# Patient Record
Sex: FEMALE | Race: WHITE | NOT HISPANIC OR LATINO | Employment: OTHER | URBAN - METROPOLITAN AREA
[De-identification: names, ages, dates, MRNs, and addresses within clinical notes are randomized per-mention and may not be internally consistent; named-entity substitution may affect disease eponyms.]

---

## 2017-07-24 ENCOUNTER — OFFICE VISIT (OUTPATIENT)
Dept: GASTROENTEROLOGY | Facility: CLINIC | Age: 72
End: 2017-07-24

## 2017-07-24 VITALS
SYSTOLIC BLOOD PRESSURE: 126 MMHG | BODY MASS INDEX: 27.3 KG/M2 | DIASTOLIC BLOOD PRESSURE: 74 MMHG | HEIGHT: 66 IN | WEIGHT: 169.85 LBS

## 2017-07-24 DIAGNOSIS — A04.8 BACTERIAL INFECTION DUE TO HELICOBACTER PYLORI: ICD-10-CM

## 2017-07-24 DIAGNOSIS — Z12.11 ENCOUNTER FOR SCREENING FOR MALIGNANT NEOPLASM OF COLON: Primary | ICD-10-CM

## 2017-07-24 PROCEDURE — 99204 OFFICE O/P NEW MOD 45 MIN: CPT | Performed by: INTERNAL MEDICINE

## 2017-07-24 NOTE — PROGRESS NOTES
Chief Complaint   Patient presents with   • Chest Pain   • Difficulty Swallowing       Ruby Pabon is a 72 y.o. female who presents with Weight loss 1 year ago, diagnosed with H. pylori, in need of screening colonoscopy    HPI Comments: 72-year-old female that developed melena and weight loss 1 year ago.  She was placed on a PPI and check for H. pylori.  She was positive for H. pylori and was given 2 weeks of antibiotics.  She is here to schedule her screening colonoscopy and would like me to perform EGD due to the symptoms she was having last year.      Past Medical History:   Diagnosis Date   • Hyperlipidemia    • Hypertension        Past Surgical History:   Procedure Laterality Date   • COLONOSCOPY  approx 2007    normal per patient   • WISDOM TOOTH EXTRACTION           Current Outpatient Prescriptions:   •  LISINOPRIL PO, Take  by mouth., Disp: , Rfl:   •  metoprolol tartrate (LOPRESSOR) 25 MG tablet, Take 25 mg by mouth 2 (Two) Times a Day., Disp: , Rfl:   •  Unable to find, 1 each 1 (One) Time. Statin inknown to patient, Disp: , Rfl:     No Known Allergies    Social History     Social History   • Marital status:      Spouse name: N/A   • Number of children: N/A   • Years of education: N/A     Occupational History   • Not on file.     Social History Main Topics   • Smoking status: Former Smoker     Types: Cigarettes     Quit date: 1964   • Smokeless tobacco: Not on file   • Alcohol use 0.6 oz/week     1 Glasses of wine per week      Comment: qod   • Drug use: No   • Sexual activity: Not on file     Other Topics Concern   • Not on file     Social History Narrative   • No narrative on file       History reviewed. No pertinent family history.    Review of Systems   Gastrointestinal: Negative for abdominal distention, abdominal pain, anal bleeding, blood in stool, diarrhea, nausea and rectal pain.   All other systems reviewed and are negative.      Vitals:    07/24/17 1443   BP: 126/74       Physical Exam    Constitutional: She is oriented to person, place, and time. She appears well-developed and well-nourished.   HENT:   Head: Normocephalic and atraumatic.   Eyes: Pupils are equal, round, and reactive to light.   Cardiovascular: Normal rate, regular rhythm and normal heart sounds.    Pulmonary/Chest: Effort normal and breath sounds normal.   Abdominal: Soft. Bowel sounds are normal. She exhibits no shifting dullness, no distension, no pulsatile liver, no fluid wave, no abdominal bruit, no ascites, no pulsatile midline mass and no mass. There is no hepatosplenomegaly. There is no tenderness. There is no rigidity and no guarding. No hernia.   Musculoskeletal: Normal range of motion.   Neurological: She is alert and oriented to person, place, and time.   Skin: Skin is warm and dry.   Psychiatric: She has a normal mood and affect. Her behavior is normal. Thought content normal.   Nursing note and vitals reviewed.      Problem list    Abdominal pain  Weight loss  H. pylori positive  Melena  screening colonoscopy      Assessment/Plan    Based on the above issues he will undergo EGD and colonoscopy    An EGD and  colonoscopy will be scheduled by my staff, the instructions will either be handed to you or mailed to you.  You'll receive an appointment date and time.  You will need to bring a  with you on that day to drive you home.    We will check for H. pylori eradication on that day

## 2017-08-17 ENCOUNTER — ANESTHESIA EVENT (OUTPATIENT)
Dept: GASTROENTEROLOGY | Facility: HOSPITAL | Age: 72
End: 2017-08-17

## 2017-08-17 ENCOUNTER — HOSPITAL ENCOUNTER (OUTPATIENT)
Facility: HOSPITAL | Age: 72
Setting detail: HOSPITAL OUTPATIENT SURGERY
Discharge: HOME | End: 2017-08-17
Attending: INTERNAL MEDICINE | Admitting: INTERNAL MEDICINE

## 2017-08-17 ENCOUNTER — ANESTHESIA (OUTPATIENT)
Dept: GASTROENTEROLOGY | Facility: HOSPITAL | Age: 72
End: 2017-08-17

## 2017-08-17 VITALS
TEMPERATURE: 97.5 F | BODY MASS INDEX: 26.47 KG/M2 | RESPIRATION RATE: 14 BRPM | DIASTOLIC BLOOD PRESSURE: 76 MMHG | HEART RATE: 64 BPM | SYSTOLIC BLOOD PRESSURE: 123 MMHG | OXYGEN SATURATION: 95 % | WEIGHT: 164 LBS

## 2017-08-17 DIAGNOSIS — Z12.11 ENCOUNTER FOR SCREENING FOR MALIGNANT NEOPLASM OF COLON: ICD-10-CM

## 2017-08-17 DIAGNOSIS — A04.8 BACTERIAL INFECTION DUE TO HELICOBACTER PYLORI: ICD-10-CM

## 2017-08-17 PROCEDURE — 25010000002 PROPOFOL 10 MG/ML EMULSION: Performed by: ANESTHESIOLOGY

## 2017-08-17 PROCEDURE — G0121 COLON CA SCRN NOT HI RSK IND: HCPCS | Performed by: INTERNAL MEDICINE

## 2017-08-17 PROCEDURE — 43239 EGD BIOPSY SINGLE/MULTIPLE: CPT | Performed by: INTERNAL MEDICINE

## 2017-08-17 PROCEDURE — 88312 SPECIAL STAINS GROUP 1: CPT | Performed by: INTERNAL MEDICINE

## 2017-08-17 PROCEDURE — 88305 TISSUE EXAM BY PATHOLOGIST: CPT | Performed by: INTERNAL MEDICINE

## 2017-08-17 RX ORDER — LIDOCAINE HYDROCHLORIDE 20 MG/ML
INJECTION, SOLUTION INFILTRATION; PERINEURAL AS NEEDED
Status: DISCONTINUED | OUTPATIENT
Start: 2017-08-17 | End: 2017-08-17 | Stop reason: SURG

## 2017-08-17 RX ORDER — SODIUM CHLORIDE, SODIUM LACTATE, POTASSIUM CHLORIDE, CALCIUM CHLORIDE 600; 310; 30; 20 MG/100ML; MG/100ML; MG/100ML; MG/100ML
1000 INJECTION, SOLUTION INTRAVENOUS CONTINUOUS PRN
Status: DISCONTINUED | OUTPATIENT
Start: 2017-08-17 | End: 2017-08-17 | Stop reason: HOSPADM

## 2017-08-17 RX ORDER — PROPOFOL 10 MG/ML
VIAL (ML) INTRAVENOUS CONTINUOUS PRN
Status: DISCONTINUED | OUTPATIENT
Start: 2017-08-17 | End: 2017-08-17 | Stop reason: SURG

## 2017-08-17 RX ORDER — PROPOFOL 10 MG/ML
VIAL (ML) INTRAVENOUS AS NEEDED
Status: DISCONTINUED | OUTPATIENT
Start: 2017-08-17 | End: 2017-08-17 | Stop reason: SURG

## 2017-08-17 RX ADMIN — SODIUM CHLORIDE, POTASSIUM CHLORIDE, SODIUM LACTATE AND CALCIUM CHLORIDE 1000 ML: 600; 310; 30; 20 INJECTION, SOLUTION INTRAVENOUS at 10:32

## 2017-08-17 RX ADMIN — PROPOFOL 100 MCG/KG/MIN: 10 INJECTION, EMULSION INTRAVENOUS at 10:55

## 2017-08-17 RX ADMIN — LIDOCAINE HYDROCHLORIDE 60 MG: 20 INJECTION, SOLUTION INFILTRATION; PERINEURAL at 10:55

## 2017-08-17 RX ADMIN — PROPOFOL 100 MG: 10 INJECTION, EMULSION INTRAVENOUS at 10:55

## 2017-08-17 RX ADMIN — SODIUM CHLORIDE, POTASSIUM CHLORIDE, SODIUM LACTATE AND CALCIUM CHLORIDE: 600; 310; 30; 20 INJECTION, SOLUTION INTRAVENOUS at 10:55

## 2017-08-17 NOTE — ANESTHESIA PREPROCEDURE EVALUATION
Anesthesia Evaluation     Patient summary reviewed and Nursing notes reviewed          Airway   Mallampati: I  TM distance: <3 FB  Neck ROM: full  no difficulty expected  Dental - normal exam     Pulmonary - negative pulmonary ROS and normal exam   Cardiovascular - normal exam    (+) hypertension, hyperlipidemia      Neuro/Psych- negative ROS  GI/Hepatic/Renal/Endo - negative ROS     Musculoskeletal (-) negative ROS    Abdominal  - normal exam    Bowel sounds: normal.   Substance History - negative use     OB/GYN negative ob/gyn ROS         Other                                        Anesthesia Plan    ASA 2     MAC     Anesthetic plan and risks discussed with patient.

## 2017-08-17 NOTE — PLAN OF CARE
Problem: Patient Care Overview (Adult)  Goal: Plan of Care Review  Outcome: Ongoing (interventions implemented as appropriate)    08/17/17 1013   Coping/Psychosocial Response Interventions   Plan Of Care Reviewed With patient       Goal: Adult Individualization and Mutuality  Outcome: Ongoing (interventions implemented as appropriate)    08/17/17 1013   Individualization   Patient Specific Preferences none       Goal: Discharge Needs Assessment  Outcome: Ongoing (interventions implemented as appropriate)    08/17/17 1013   Discharge Needs Assessment   Concerns To Be Addressed no discharge needs identified   Living Environment   Transportation Available family or friend will provide         Problem: GI Endoscopy (Adult)  Goal: Signs and Symptoms of Listed Potential Problems Will be Absent or Manageable (GI Endoscopy)  Outcome: Ongoing (interventions implemented as appropriate)    08/17/17 1013   GI Endoscopy   Problems Assessed (GI Endoscopy) pain   Problems Present (GI Endoscopy) none

## 2017-08-17 NOTE — ANESTHESIA POSTPROCEDURE EVALUATION
Patient: Demi Pabon    Procedure Summary     Date Anesthesia Start Anesthesia Stop Room / Location    08/17/17 1055 1135  [facility] /  [facility]       Procedure Diagnosis Surgeon Provider    ESOPHAGOGASTRODUODENOSCOPY WITH BIOPSY (N/A Esophagus); COLONOSCOPY TO CECUM AND TERM. ILEUM (N/A ) Bacterial infection due to Helicobacter pylori; Encounter for screening for malignant neoplasm of colon  (Bacterial infection due to Helicobacter pylori [A04.8]; Encounter for screening for malignant neoplasm of colon [Z12.11]) MD Hermilo Bolaños MD          Anesthesia Type: MAC  Last vitals  BP   123/76 (08/17/17 1150)    Temp   36.4 °C (97.5 °F) (08/17/17 1140)    Pulse   64 (08/17/17 1150)   Resp   14 (08/17/17 1150)    SpO2   95 % (08/17/17 1150)      Post Anesthesia Care and Evaluation    Patient location during evaluation: PACU  Patient participation: complete - patient participated  Level of consciousness: awake and alert  Pain score: 0  Pain management: adequate  Airway patency: patent  Anesthetic complications: No anesthetic complications  PONV Status: none  Cardiovascular status: acceptable  Respiratory status: acceptable  Hydration status: acceptable

## 2017-08-18 LAB
CYTO UR: NORMAL
LAB AP CASE REPORT: NORMAL
Lab: NORMAL
PATH REPORT.FINAL DX SPEC: NORMAL
PATH REPORT.GROSS SPEC: NORMAL

## 2017-08-23 ENCOUNTER — TELEPHONE (OUTPATIENT)
Dept: GASTROENTEROLOGY | Facility: CLINIC | Age: 72
End: 2017-08-23

## 2017-08-23 RX ORDER — PANTOPRAZOLE SODIUM 40 MG/1
40 TABLET, DELAYED RELEASE ORAL DAILY
Qty: 30 TABLET | Refills: 2 | Status: SHIPPED | OUTPATIENT
Start: 2017-08-23 | End: 2018-05-16

## 2017-08-23 NOTE — TELEPHONE ENCOUNTER
Notes Recorded by Dwain Doe MD on 8/19/2017 at 5:18 PM  H. pylori negative  Colonoscopy recall 10 years  Continue PPI 3 months  Patient called, advised of Dr. Doe's note, advised Pantoprazole has been e-scribed to her pharmacy. Advised to call back for questions/concerns. Patient's health maintenance record updated to reflec the need to repeat colonoscopy in 10 years.

## 2017-08-23 NOTE — TELEPHONE ENCOUNTER
----- Message from Lisa Bauman sent at 8/23/2017 10:26 AM EDT -----  Regarding: [facility] WITH PHARM LISTED ON CHART CALLED  Contact: [phone]  PT HAD SCOPE. THERE SHOULD HAVE BEEN SCRIPTS CALLED IN. THEY DIDN'T RECEIVE ANYTHING. PLEASE CHECK AND CALL THE PHARM ON PHN#ABOVE.

## 2018-02-07 ENCOUNTER — OFFICE VISIT (OUTPATIENT)
Dept: CARDIOLOGY | Facility: CLINIC | Age: 73
End: 2018-02-07

## 2018-02-07 VITALS
SYSTOLIC BLOOD PRESSURE: 122 MMHG | DIASTOLIC BLOOD PRESSURE: 78 MMHG | WEIGHT: 168 LBS | BODY MASS INDEX: 27 KG/M2 | HEART RATE: 58 BPM | HEIGHT: 66 IN

## 2018-02-07 DIAGNOSIS — R00.2 PALPITATIONS: ICD-10-CM

## 2018-02-07 DIAGNOSIS — I77.9 BILATERAL CAROTID ARTERY DISEASE (HCC): ICD-10-CM

## 2018-02-07 DIAGNOSIS — I10 ESSENTIAL HYPERTENSION: Primary | ICD-10-CM

## 2018-02-07 DIAGNOSIS — E78.2 MIXED HYPERLIPIDEMIA: ICD-10-CM

## 2018-02-07 PROBLEM — R07.2 PRECORDIAL PAIN: Status: ACTIVE | Noted: 2018-02-07

## 2018-02-07 PROBLEM — F41.9 ANXIETY: Status: ACTIVE | Noted: 2018-02-07

## 2018-02-07 PROBLEM — K21.9 GASTROESOPHAGEAL REFLUX DISEASE WITHOUT ESOPHAGITIS: Status: ACTIVE | Noted: 2018-02-07

## 2018-02-07 PROCEDURE — 99204 OFFICE O/P NEW MOD 45 MIN: CPT | Performed by: INTERNAL MEDICINE

## 2018-02-07 PROCEDURE — 93000 ELECTROCARDIOGRAM COMPLETE: CPT | Performed by: NURSE PRACTITIONER

## 2018-02-07 RX ORDER — ATORVASTATIN CALCIUM 10 MG/1
10 TABLET, FILM COATED ORAL DAILY
COMMUNITY

## 2018-02-07 RX ORDER — ZOLPIDEM TARTRATE 10 MG/1
5 TABLET ORAL AS NEEDED
COMMUNITY
Start: 2017-11-06

## 2018-02-07 RX ORDER — PAROXETINE 10 MG/1
5 TABLET, FILM COATED ORAL EVERY MORNING
COMMUNITY

## 2018-02-07 RX ORDER — ASPIRIN 325 MG
162 TABLET ORAL DAILY
COMMUNITY

## 2018-02-07 NOTE — PROGRESS NOTES
Subjective:     Encounter Date:02/07/2018      Patient ID: Demi Pabon is a 72 y.o. female.    Reason for consultation: Uncontrolled hypertension    Problem List:  Problem   Essential Hypertension   Mixed Hyperlipidemia   Anxiety   Palpitations    · Myocardial perfusion study (08/02/2017): Normal study without evidence of ischemia.  Normal LVEF 60%.  · Echocardiogram (08/2017): Mild to moderate MR. Normal LVEF=55%  · Holter Monitor (08/2017):No arrhythmia's or pauses.  NSR     Bilateral Carotid Artery Disease    · Carotid Duplex (05/2017): Bilateral moderate plaque <50%         No Known Allergies      Current Outpatient Prescriptions:   •  aspirin 325 MG tablet, Take 162 mg by mouth Daily., Disp: , Rfl:   •  atorvastatin (LIPITOR) 10 MG tablet, Take 10 mg by mouth Daily., Disp: , Rfl:   •  LISINOPRIL PO, Take 20 mg by mouth Daily., Disp: , Rfl:   •  metoprolol tartrate (LOPRESSOR) 25 MG tablet, Take 25 mg by mouth Daily., Disp: , Rfl:   •  PARoxetine (PAXIL) 10 MG tablet, Take 5 mg by mouth Every Morning., Disp: , Rfl:   •  zolpidem (AMBIEN) 10 MG tablet, As Needed., Disp: , Rfl:   •  pantoprazole (PROTONIX) 40 MG EC tablet, Take 1 tablet by mouth Daily., Disp: 30 tablet, Rfl: 2  •  Unable to find, 1 each 1 (One) Time. Statin inknown to patient, Disp: , Rfl:     HPI:  Patient is a 72-year-old female that is being referred by Dr. Pederson for evaluation of her uncontrolled hypertension.  The patient has been taking lisinopril and metoprolol for quite some time.  This past fall she noticed her blood pressures have started to get uncontrolled.  Her systolics have been running in the 150s to 180s. She has not checked her home blood pressure cuff to ensure it is accurate.  Last May she was experiencing lightheadedness and palpitations and was referred to Cardiology for evaluation with Dr. Ware.  Workup included a negative stress test, normal echocardiogram showing only mild to moderate MR and normal LVEF and a  "normal Holter monitor showing no arrhythmias or pauses and normal sinus rhythm.  She also underwent a carotid duplex that showed bilateral mild to moderate plaque less than 50%.  She has no longer been experiencing palpitations associated with lightheadedness and she denies dyspnea or chest pain.  She has a history of hyperlipidemia on low-dose statin therapy.  She lost her  last November unexpectedly when he suffered a myocardial infarction and went into cardiogenic shock.  She appears to be coping as well as can be expected.  She does take Paxil for anxiety/depression.  She lives in [place], [place].  She denies smoking or use of alcohol.    Cardiac Risk Factors: Advanced age, hypertension, hyperlipidemia, Carotid plaque, family history    Social History: Former smoker in early 20's for 4 yrs.        Family history:  Son MI in his 30's, brother MI in his 40's    Review of Systems:  The following portions of the patient's history were reviewed and updated as appropriate: allergies, current medications and problem list.    The above systems were reviewed and pertinent positives were noted.    Objective:        Blood pressure 122/78, pulse 58, height 167.6 cm (66\"), weight 76.2 kg (168 lb).    Physical Exam    General:Well-developed well-nourished, no acute distress  HEENT: Pupils equal round and reactive to light.  Oropharynx is clear and moist.  CV: Regular rate and rhythm with no murmur gallop or rub.  Nondisplaced focal PMI  Resp: The lungs are clear bilaterally with no rales or wheezes.  Equal expansion is noted.  GI: The abdomen is soft and nontender with normal bowel sounds throughout.  No hepatosplenomegaly or midline bruits are present.  : deferred  Musculoskeletal: No gross joint deformities are noted  Skin: Warm and dry  Neurologic: Nonfocal  Psychiatric: Normal mood and affect    Admission on 08/17/2017, Discharged on 08/17/2017   Component Date Value Ref Range Status   • Case Report 08/17/2017 "    Final                    Value:Surgical Pathology Report                         Case: [id number]                                  Authorizing Provider:  Dwain Doe MD         Collected:           08/17/2017 11:00 AM          Ordering Location:     [facility]  Received:            08/17/2017 11:58 AM                                 ENDO SUITES                                                                  Pathologist:           Vince Giron MD                                                        Specimens:   1) - Small Intestine, Duodenum, DUODENAL BIOPSY                                                     2) - Stomach, GASTRIC ULCER BIOPSY                                                        • Final Diagnosis 08/17/2017    Final                    Value:This result contains rich text formatting which cannot be displayed here.   • Gross Description 08/17/2017    Final                    Value:This result contains rich text formatting which cannot be displayed here.   • Microscopic Description 08/17/2017    Final                    Value:This result contains rich text formatting which cannot be displayed here.       Diagnostic Data:              ECG 12 Lead  Date/Time: 2/7/2018 11:59 AM  Performed by: CLARIBEL LONGO  Authorized by: CLARIBEL LONGO   Rhythm: sinus bradycardia  BPM: 58  Conduction: left bundle branch block  Clinical impression: abnormal ECG  Comments: Sinus bradycardia with left bundle branch block  CO interval 160 MS  QRS duration 144 MS next line QT/QTc 474/465 MS            Assessment:     Patient Active Problem List   Diagnosis   • Essential hypertension   • Mixed hyperlipidemia   • Anxiety   • Palpitations   • Bilateral carotid artery disease       Plan:           · Recheck Lipids and CMP today.  Aggressive with statin therapy for stroke and heart attack prevention.  · Have blood pressure cuff checked with PCP and call our clinic with result  · Keep blood  pressure log for 2 weeks and call us with results.  Goal is SBP,140 and DBP<80  · Follow up 3 months or sooner if needed      Jalyn SÁNCHEZ scribed for Dr Bianka Rolle    I Bianka Rolle MD personally performed the services described in this documentation as scribed by the above individual in my presence, and it is both accurate and complete.    Bianka Rolle MD, FACC

## 2018-02-22 ENCOUNTER — TELEPHONE (OUTPATIENT)
Dept: CARDIOLOGY | Facility: CLINIC | Age: 73
End: 2018-02-22

## 2018-02-22 RX ORDER — LISINOPRIL 40 MG/1
40 TABLET ORAL DAILY
Qty: 90 TABLET | Refills: 3 | Status: SHIPPED | OUTPATIENT
Start: 2018-02-22

## 2018-02-22 NOTE — TELEPHONE ENCOUNTER
Dr. Rolle reviewed readings and recommends increasing Lisinopril to 40 mg daily-   Left msg on her VM with instructions- new script sent to pharmacy

## 2018-02-22 NOTE — TELEPHONE ENCOUNTER
Pt calls back with BP readings since office visit on 2/7/2018-     PT's BP cuff was checked at PCP and was ok-     2/10/18  6pm: 150/81 65  2/11/18  0730: no meds: 130/68 60  2/12/18   745pm: 168/80 63  2/15/18  730 pm: 99/50 73 (glass of wine) 108/58 73   2/16/18  0635 am 128/70 55   7pm 151/83   2/17/18   1100 am  154/78  77  2/18/18     350 pm  145/85  69  2/21/18     1050 am 155/79  69  2/22/18   No meds 8am 175/91  61

## 2018-05-16 ENCOUNTER — OFFICE VISIT (OUTPATIENT)
Dept: CARDIOLOGY | Facility: CLINIC | Age: 73
End: 2018-05-16

## 2018-05-16 VITALS
HEIGHT: 66 IN | HEART RATE: 60 BPM | OXYGEN SATURATION: 94 % | SYSTOLIC BLOOD PRESSURE: 112 MMHG | BODY MASS INDEX: 27.19 KG/M2 | WEIGHT: 169.2 LBS | DIASTOLIC BLOOD PRESSURE: 72 MMHG

## 2018-05-16 DIAGNOSIS — E78.2 MIXED HYPERLIPIDEMIA: ICD-10-CM

## 2018-05-16 DIAGNOSIS — I77.9 BILATERAL CAROTID ARTERY DISEASE (HCC): ICD-10-CM

## 2018-05-16 DIAGNOSIS — I10 ESSENTIAL HYPERTENSION: Primary | ICD-10-CM

## 2018-05-16 PROCEDURE — 99213 OFFICE O/P EST LOW 20 MIN: CPT | Performed by: INTERNAL MEDICINE

## 2018-05-16 RX ORDER — METOPROLOL SUCCINATE 25 MG/1
25 TABLET, EXTENDED RELEASE ORAL DAILY
Qty: 90 TABLET | Refills: 3 | Status: SHIPPED | OUTPATIENT
Start: 2018-05-16 | End: 2019-04-30 | Stop reason: SDUPTHER

## 2018-05-16 NOTE — PROGRESS NOTES
Demi Pabon  1945  [phone]      05/16/2018    José Luis Pederson DO    Chief Complaint   Patient presents with   • Hypertension       Problem List:  1. Hypertension  2. Hyperlipidemia  3. Bilateral carotid artery disease  4. Palpitations  5. Anxiety      No Known Allergies    Current Medications:      Current Outpatient Prescriptions:   •  aspirin 325 MG tablet, Take 162 mg by mouth Daily., Disp: , Rfl:   •  atorvastatin (LIPITOR) 10 MG tablet, Take 10 mg by mouth Daily., Disp: , Rfl:   •  lisinopril (PRINIVIL,ZESTRIL) 40 MG tablet, Take 1 tablet by mouth Daily., Disp: 90 tablet, Rfl: 3  •  metoprolol tartrate (LOPRESSOR) 25 MG tablet, Take 25 mg by mouth Daily., Disp: , Rfl:   •  PARoxetine (PAXIL) 10 MG tablet, Take 5 mg by mouth Every Morning., Disp: , Rfl:   •  zolpidem (AMBIEN) 10 MG tablet, Take 5 mg by mouth As Needed., Disp: , Rfl:     HPI    Demi Pabon presents today for follow up of her hypertension, hyperlipidemia, and bilateral carotid artery disease. Since last visit, patient has been experiencing symptomatic hypotension. She states that her current blood pressure of 112/72 makes her want to go to sleep. Blood pressures tend to be high in the morning and can get as low as 99 after she takes her medications the afternoon.  She monitors her blood pressure at home and notes that it runs high when she first wakes up, and then levels out once she takes her medication. Patient states that she does not sleep well at night, and that this may be the cause of her elevated blood pressures. She reports that she takes all her medications at the same time in the mornings. She denies edema. Patient denies chest pain, palpitations, shortness of breath, edema, PND, orthopnea, dizziness, and syncope. She admittedly does not do much in terms of routine exercise.    The following portions of the patient's history were reviewed and updated as appropriate: allergies, current medications and problem  "list.    Pertinent positives as listed in the HPI.  All other systems reviewed are negative.    Vitals:    05/16/18 1416   BP: 112/72   BP Location: Right arm   Patient Position: Sitting   Pulse: 60   SpO2: 94%   Weight: 76.7 kg (169 lb 3.2 oz)   Height: 167.6 cm (66\")       Physical Exam:  General: Alert and oriented to person, place, and time.  Neck: Jugular venous pressure is within normal limits. Carotids have normal upstrokes without bruits.   Cardiovascular: Regular rate and rhythm without murmur gallop or rub.  Lungs: Clear without rales or wheezes. Equal expansion is noted.   Extremities: Show no edema.  Skin: warm and dry.  Neurologic: nonfocal    Diagnostic Data:    Procedures    Assessment:      ICD-10-CM ICD-9-CM   1. Essential hypertension I10 401.9   2. Mixed hyperlipidemia E78.2 272.2   3. Bilateral carotid artery disease I77.9 447.9       Plan:    1. Start routine exercise regimen; 30 minutes per day; 4-5 days per week.  2. Recommend taking medications at night before bed.  3. Switch metoprolol from tartrate to [id number] mg a day  4. Continue current medications.  5. F/up in 2 months or sooner if needed with a list of blood pressure readings.    Scribed for Bianka Rolle MD by Noel Aranda. 5/16/2018  2:27 PM     I Bianka Rolle MD personally performed the services described in this documentation as scribed by the above individual in my presence, and it is both accurate and complete.    Bianka Rolle MD, FACC        "

## 2018-12-17 ENCOUNTER — TELEPHONE (OUTPATIENT)
Dept: CARDIOLOGY | Facility: CLINIC | Age: 73
End: 2018-12-17

## 2018-12-17 NOTE — TELEPHONE ENCOUNTER
"PT calls with concerns that her BP has been running higher than it has been- 140-190/ HR \"low\" per patient-     When going over her medications and doses, it was discovered that her PCP refilled lisinopril at 20 mg daily, when she had been taking 40 mg daily- will have her to increase back to 40 mg daily and she will call me next week with an update or sooner if needed  "

## 2019-04-30 RX ORDER — METOPROLOL SUCCINATE 25 MG/1
25 TABLET, EXTENDED RELEASE ORAL DAILY
Qty: 30 TABLET | Refills: 0 | Status: SHIPPED | OUTPATIENT
Start: 2019-04-30 | End: 2019-07-23 | Stop reason: SDUPTHER

## 2019-07-23 RX ORDER — METOPROLOL SUCCINATE 25 MG/1
TABLET, EXTENDED RELEASE ORAL
Qty: 90 TABLET | Refills: 3 | Status: SHIPPED | OUTPATIENT
Start: 2019-07-23

## 2022-06-09 ENCOUNTER — HOSPITAL ENCOUNTER (OUTPATIENT)
Dept: GENERAL RADIOLOGY | Facility: HOSPITAL | Age: 77
Discharge: HOME | End: 2022-06-09

## 2022-06-09 ENCOUNTER — LAB (OUTPATIENT)
Dept: LAB | Facility: HOSPITAL | Age: 77
End: 2022-06-09

## 2022-06-09 ENCOUNTER — TRANSCRIBE ORDERS (OUTPATIENT)
Dept: ADMINISTRATIVE | Facility: HOSPITAL | Age: 77
End: 2022-06-09

## 2022-06-09 ENCOUNTER — HOSPITAL ENCOUNTER (OUTPATIENT)
Dept: CARDIOLOGY | Facility: HOSPITAL | Age: 77
Discharge: HOME | End: 2022-06-09

## 2022-06-09 DIAGNOSIS — Z01.818 PRE-OP TESTING: ICD-10-CM

## 2022-06-09 DIAGNOSIS — Z01.818 PRE-OP TESTING: Primary | ICD-10-CM

## 2022-06-09 LAB
ABO GROUP BLD: NORMAL
ALBUMIN SERPL-MCNC: 4.4 G/DL (ref 3.5–5.2)
ALBUMIN/GLOB SERPL: 1.6 G/DL
ALP SERPL-CCNC: 111 U/L (ref 39–117)
ALT SERPL W P-5'-P-CCNC: 12 U/L (ref 1–33)
ANION GAP SERPL CALCULATED.3IONS-SCNC: 13.8 MMOL/L (ref 5–15)
AST SERPL-CCNC: 25 U/L (ref 1–32)
BACTERIA UR QL AUTO: ABNORMAL /HPF
BASOPHILS # BLD AUTO: 0.08 10*3/MM3 (ref 0–0.2)
BASOPHILS NFR BLD AUTO: 0.9 % (ref 0–1.5)
BILIRUB SERPL-MCNC: 0.3 MG/DL (ref 0–1.2)
BILIRUB UR QL STRIP: NEGATIVE
BLD GP AB SCN SERPL QL: NEGATIVE
BUN SERPL-MCNC: 22 MG/DL (ref 8–23)
BUN/CREAT SERPL: 23.2 (ref 7–25)
CALCIUM SPEC-SCNC: 9.5 MG/DL (ref 8.6–10.5)
CHLORIDE SERPL-SCNC: 101 MMOL/L (ref 98–107)
CLARITY UR: CLEAR
CO2 SERPL-SCNC: 22.2 MMOL/L (ref 22–29)
COLOR UR: YELLOW
CREAT SERPL-MCNC: 0.95 MG/DL (ref 0.57–1)
DEPRECATED RDW RBC AUTO: 40.4 FL (ref 37–54)
EGFRCR SERPLBLD CKD-EPI 2021: 61.1 ML/MIN/1.73
EOSINOPHIL # BLD AUTO: 0.22 10*3/MM3 (ref 0–0.4)
EOSINOPHIL NFR BLD AUTO: 2.5 % (ref 0.3–6.2)
ERYTHROCYTE [DISTWIDTH] IN BLOOD BY AUTOMATED COUNT: 11.7 % (ref 12.3–15.4)
GLOBULIN UR ELPH-MCNC: 2.8 GM/DL
GLUCOSE SERPL-MCNC: 100 MG/DL (ref 65–99)
GLUCOSE UR STRIP-MCNC: NEGATIVE MG/DL
HCT VFR BLD AUTO: 37.3 % (ref 34–46.6)
HGB BLD-MCNC: 12.5 G/DL (ref 12–15.9)
HGB UR QL STRIP.AUTO: ABNORMAL
HYALINE CASTS UR QL AUTO: ABNORMAL /LPF
IMM GRANULOCYTES # BLD AUTO: 0.02 10*3/MM3 (ref 0–0.05)
IMM GRANULOCYTES NFR BLD AUTO: 0.2 % (ref 0–0.5)
INR PPP: 0.95 (ref 0.93–1.1)
KETONES UR QL STRIP: NEGATIVE
LEUKOCYTE ESTERASE UR QL STRIP.AUTO: ABNORMAL
LYMPHOCYTES # BLD AUTO: 1.94 10*3/MM3 (ref 0.7–3.1)
LYMPHOCYTES NFR BLD AUTO: 21.7 % (ref 19.6–45.3)
MCH RBC QN AUTO: 31.6 PG (ref 26.6–33)
MCHC RBC AUTO-ENTMCNC: 33.5 G/DL (ref 31.5–35.7)
MCV RBC AUTO: 94.4 FL (ref 79–97)
MONOCYTES # BLD AUTO: 0.65 10*3/MM3 (ref 0.1–0.9)
MONOCYTES NFR BLD AUTO: 7.3 % (ref 5–12)
NEUTROPHILS NFR BLD AUTO: 6.04 10*3/MM3 (ref 1.7–7)
NEUTROPHILS NFR BLD AUTO: 67.4 % (ref 42.7–76)
NITRITE UR QL STRIP: NEGATIVE
NRBC BLD AUTO-RTO: 0 /100 WBC (ref 0–0.2)
PH UR STRIP.AUTO: 5.5 [PH] (ref 5–8)
PLATELET # BLD AUTO: 383 10*3/MM3 (ref 140–450)
PMV BLD AUTO: 10.1 FL (ref 6–12)
POTASSIUM SERPL-SCNC: 4.4 MMOL/L (ref 3.5–5.2)
PROT SERPL-MCNC: 7.2 G/DL (ref 6–8.5)
PROT UR QL STRIP: ABNORMAL
PROTHROMBIN TIME: 9.8 SECONDS (ref 9.6–11.7)
RBC # BLD AUTO: 3.95 10*6/MM3 (ref 3.77–5.28)
RBC # UR STRIP: ABNORMAL /HPF
REF LAB TEST METHOD: ABNORMAL
RH BLD: POSITIVE
SODIUM SERPL-SCNC: 137 MMOL/L (ref 136–145)
SP GR UR STRIP: 1.02 (ref 1–1.03)
SQUAMOUS #/AREA URNS HPF: ABNORMAL /HPF
T&S EXPIRATION DATE: NORMAL
UROBILINOGEN UR QL STRIP: ABNORMAL
WBC # UR STRIP: ABNORMAL /HPF
WBC NRBC COR # BLD: 8.95 10*3/MM3 (ref 3.4–10.8)

## 2022-06-09 PROCEDURE — 87086 URINE CULTURE/COLONY COUNT: CPT

## 2022-06-09 PROCEDURE — 71046 X-RAY EXAM CHEST 2 VIEWS: CPT

## 2022-06-09 PROCEDURE — 86901 BLOOD TYPING SEROLOGIC RH(D): CPT

## 2022-06-09 PROCEDURE — 85025 COMPLETE CBC W/AUTO DIFF WBC: CPT

## 2022-06-09 PROCEDURE — 86900 BLOOD TYPING SEROLOGIC ABO: CPT

## 2022-06-09 PROCEDURE — 93010 ELECTROCARDIOGRAM REPORT: CPT | Performed by: INTERNAL MEDICINE

## 2022-06-09 PROCEDURE — 86850 RBC ANTIBODY SCREEN: CPT

## 2022-06-09 PROCEDURE — 80053 COMPREHEN METABOLIC PANEL: CPT

## 2022-06-09 PROCEDURE — 93005 ELECTROCARDIOGRAM TRACING: CPT | Performed by: ORTHOPAEDIC SURGERY

## 2022-06-09 PROCEDURE — 81001 URINALYSIS AUTO W/SCOPE: CPT

## 2022-06-09 PROCEDURE — 36415 COLL VENOUS BLD VENIPUNCTURE: CPT

## 2022-06-09 PROCEDURE — 85610 PROTHROMBIN TIME: CPT

## 2022-06-10 LAB — BACTERIA SPEC AEROBE CULT: NO GROWTH

## 2022-06-13 LAB — QT INTERVAL: 463 MS

## 2022-10-24 ENCOUNTER — HOSPITAL ENCOUNTER (OUTPATIENT)
Dept: CARDIOLOGY | Facility: HOSPITAL | Age: 77
Discharge: HOME | End: 2022-10-24

## 2022-10-24 ENCOUNTER — LAB (OUTPATIENT)
Dept: LAB | Facility: HOSPITAL | Age: 77
End: 2022-10-24

## 2022-10-24 ENCOUNTER — TRANSCRIBE ORDERS (OUTPATIENT)
Dept: ADMINISTRATIVE | Facility: HOSPITAL | Age: 77
End: 2022-10-24

## 2022-10-24 DIAGNOSIS — N20.0 KIDNEY STONES: ICD-10-CM

## 2022-10-24 DIAGNOSIS — N20.0 KIDNEY STONES: Primary | ICD-10-CM

## 2022-10-24 LAB
ANION GAP SERPL CALCULATED.3IONS-SCNC: 8 MMOL/L (ref 5–15)
BASOPHILS # BLD AUTO: 0.1 10*3/MM3 (ref 0–0.2)
BASOPHILS NFR BLD AUTO: 1.3 % (ref 0–1.5)
BUN SERPL-MCNC: 24 MG/DL (ref 8–23)
BUN/CREAT SERPL: 29.6 (ref 7–25)
CALCIUM SPEC-SCNC: 9.7 MG/DL (ref 8.6–10.5)
CHLORIDE SERPL-SCNC: 105 MMOL/L (ref 98–107)
CO2 SERPL-SCNC: 27 MMOL/L (ref 22–29)
CREAT SERPL-MCNC: 0.81 MG/DL (ref 0.57–1)
DEPRECATED RDW RBC AUTO: 42 FL (ref 37–54)
EGFRCR SERPLBLD CKD-EPI 2021: 73.9 ML/MIN/1.73
EOSINOPHIL # BLD AUTO: 0.31 10*3/MM3 (ref 0–0.4)
EOSINOPHIL NFR BLD AUTO: 4 % (ref 0.3–6.2)
ERYTHROCYTE [DISTWIDTH] IN BLOOD BY AUTOMATED COUNT: 12.3 % (ref 12.3–15.4)
GLUCOSE SERPL-MCNC: 84 MG/DL (ref 65–99)
HCT VFR BLD AUTO: 38.2 % (ref 34–46.6)
HGB BLD-MCNC: 12.6 G/DL (ref 12–15.9)
IMM GRANULOCYTES # BLD AUTO: 0.03 10*3/MM3 (ref 0–0.05)
IMM GRANULOCYTES NFR BLD AUTO: 0.4 % (ref 0–0.5)
LYMPHOCYTES # BLD AUTO: 2.12 10*3/MM3 (ref 0.7–3.1)
LYMPHOCYTES NFR BLD AUTO: 27.2 % (ref 19.6–45.3)
MCH RBC QN AUTO: 30.8 PG (ref 26.6–33)
MCHC RBC AUTO-ENTMCNC: 33 G/DL (ref 31.5–35.7)
MCV RBC AUTO: 93.4 FL (ref 79–97)
MONOCYTES # BLD AUTO: 0.65 10*3/MM3 (ref 0.1–0.9)
MONOCYTES NFR BLD AUTO: 8.3 % (ref 5–12)
NEUTROPHILS NFR BLD AUTO: 4.58 10*3/MM3 (ref 1.7–7)
NEUTROPHILS NFR BLD AUTO: 58.8 % (ref 42.7–76)
NRBC BLD AUTO-RTO: 0 /100 WBC (ref 0–0.2)
PLATELET # BLD AUTO: 312 10*3/MM3 (ref 140–450)
PMV BLD AUTO: 10.7 FL (ref 6–12)
POTASSIUM SERPL-SCNC: 4.2 MMOL/L (ref 3.5–5.2)
RBC # BLD AUTO: 4.09 10*6/MM3 (ref 3.77–5.28)
SODIUM SERPL-SCNC: 140 MMOL/L (ref 136–145)
WBC NRBC COR # BLD: 7.79 10*3/MM3 (ref 3.4–10.8)

## 2022-10-24 PROCEDURE — 93005 ELECTROCARDIOGRAM TRACING: CPT | Performed by: UROLOGY

## 2022-10-24 PROCEDURE — 80048 BASIC METABOLIC PNL TOTAL CA: CPT

## 2022-10-24 PROCEDURE — 85025 COMPLETE CBC W/AUTO DIFF WBC: CPT

## 2022-10-24 PROCEDURE — 36415 COLL VENOUS BLD VENIPUNCTURE: CPT

## 2022-10-24 PROCEDURE — 93010 ELECTROCARDIOGRAM REPORT: CPT | Performed by: INTERNAL MEDICINE

## 2022-10-25 LAB — QT INTERVAL: 454 MS

## 2022-11-07 ENCOUNTER — LAB REQUISITION (OUTPATIENT)
Dept: LAB | Facility: HOSPITAL | Age: 77
End: 2022-11-07

## 2022-11-07 DIAGNOSIS — N21.1 CALCULUS IN URETHRA: ICD-10-CM

## 2022-11-07 PROCEDURE — 82365 CALCULUS SPECTROSCOPY: CPT | Performed by: UROLOGY

## 2022-11-14 LAB
CA CARBONATE CRY STONE QL IR: 80 %
COLOR STONE: NORMAL
COMPN STONE: NORMAL
LABORATORY COMMENT REPORT: NORMAL
Lab: NORMAL
Lab: NORMAL
PHOTO: NORMAL
SIZE STONE: NORMAL MM
SPEC SOURCE SUBJ: NORMAL
STONE ANALYSIS-IMP: NORMAL
TRI-PHOS MFR STONE: 20 %
WT STONE: 127 MG

## (undated) DEVICE — TBG 02 CRUSH RESIST LF CLR 7FT

## (undated) DEVICE — TUBING, SUCTION, 1/4" X 10', STRAIGHT: Brand: MEDLINE

## (undated) DEVICE — CANN NASL CO2 TRULINK W/O2 A/

## (undated) DEVICE — Device: Brand: DEFENDO AIR/WATER/SUCTION AND BIOPSY VALVE

## (undated) DEVICE — SINGLE-USE BIOPSY FORCEPS: Brand: RADIAL JAW 4

## (undated) DEVICE — BITEBLOCK OMNI BLOC

## (undated) DEVICE — THE TORRENT IRRIGATION SCOPE CONNECTOR IS USED WITH THE TORRENT IRRIGATION TUBING TO PROVIDE IRRIGATION FLUIDS SUCH AS STERILE WATER DURING GASTROINTESTINAL ENDOSCOPIC PROCEDURES WHEN USED IN CONJUNCTION WITH AN IRRIGATION PUMP (OR ELECTROSURGICAL UNIT).: Brand: TORRENT